# Patient Record
Sex: MALE | Race: BLACK OR AFRICAN AMERICAN | Employment: FULL TIME | URBAN - METROPOLITAN AREA
[De-identification: names, ages, dates, MRNs, and addresses within clinical notes are randomized per-mention and may not be internally consistent; named-entity substitution may affect disease eponyms.]

---

## 2018-08-10 ENCOUNTER — APPOINTMENT (OUTPATIENT)
Dept: CT IMAGING | Age: 43
DRG: 392 | End: 2018-08-10
Attending: NURSE PRACTITIONER
Payer: SELF-PAY

## 2018-08-10 ENCOUNTER — HOSPITAL ENCOUNTER (INPATIENT)
Age: 43
LOS: 3 days | Discharge: HOME | DRG: 392 | End: 2018-08-13
Attending: EMERGENCY MEDICINE | Admitting: SURGERY
Payer: SELF-PAY

## 2018-08-10 DIAGNOSIS — K57.80 DIVERTICULITIS OF INTESTINE WITH PERFORATION WITHOUT BLEEDING, UNSPECIFIED PART OF INTESTINAL TRACT: ICD-10-CM

## 2018-08-10 DIAGNOSIS — R10.84 ABDOMINAL PAIN, GENERALIZED: Primary | ICD-10-CM

## 2018-08-10 PROBLEM — K57.92 DIVERTICULITIS: Status: ACTIVE | Noted: 2018-08-10

## 2018-08-10 LAB
ALBUMIN SERPL-MCNC: 3.2 G/DL (ref 3.5–5)
ALBUMIN/GLOB SERPL: 0.7 {RATIO} (ref 1.1–2.2)
ALP SERPL-CCNC: 82 U/L (ref 45–117)
ALT SERPL-CCNC: 43 U/L (ref 12–78)
ANION GAP SERPL CALC-SCNC: 10 MMOL/L (ref 5–15)
APPEARANCE UR: CLEAR
AST SERPL-CCNC: 31 U/L (ref 15–37)
BACTERIA URNS QL MICRO: NEGATIVE /HPF
BASOPHILS # BLD: 0.1 K/UL (ref 0–0.1)
BASOPHILS NFR BLD: 0 % (ref 0–1)
BILIRUB SERPL-MCNC: 0.7 MG/DL (ref 0.2–1)
BILIRUB UR QL: NEGATIVE
BUN SERPL-MCNC: 7 MG/DL (ref 6–20)
BUN/CREAT SERPL: 6 (ref 12–20)
CALCIUM SERPL-MCNC: 8.5 MG/DL (ref 8.5–10.1)
CHLORIDE SERPL-SCNC: 102 MMOL/L (ref 97–108)
CO2 SERPL-SCNC: 22 MMOL/L (ref 21–32)
COLOR UR: ABNORMAL
CREAT SERPL-MCNC: 1.17 MG/DL (ref 0.7–1.3)
DIFFERENTIAL METHOD BLD: ABNORMAL
EOSINOPHIL # BLD: 0 K/UL (ref 0–0.4)
EOSINOPHIL NFR BLD: 0 % (ref 0–7)
EPITH CASTS URNS QL MICRO: ABNORMAL /LPF
ERYTHROCYTE [DISTWIDTH] IN BLOOD BY AUTOMATED COUNT: 13.5 % (ref 11.5–14.5)
GLOBULIN SER CALC-MCNC: 4.4 G/DL (ref 2–4)
GLUCOSE SERPL-MCNC: 118 MG/DL (ref 65–100)
GLUCOSE UR STRIP.AUTO-MCNC: NEGATIVE MG/DL
HCT VFR BLD AUTO: 45.3 % (ref 36.6–50.3)
HGB BLD-MCNC: 14.6 G/DL (ref 12.1–17)
HGB UR QL STRIP: NEGATIVE
IMM GRANULOCYTES # BLD: 0.1 K/UL (ref 0–0.04)
IMM GRANULOCYTES NFR BLD AUTO: 0 % (ref 0–0.5)
KETONES UR QL STRIP.AUTO: NEGATIVE MG/DL
LEUKOCYTE ESTERASE UR QL STRIP.AUTO: NEGATIVE
LIPASE SERPL-CCNC: 85 U/L (ref 73–393)
LYMPHOCYTES # BLD: 1.9 K/UL (ref 0.8–3.5)
LYMPHOCYTES NFR BLD: 9 % (ref 12–49)
MCH RBC QN AUTO: 26.9 PG (ref 26–34)
MCHC RBC AUTO-ENTMCNC: 32.2 G/DL (ref 30–36.5)
MCV RBC AUTO: 83.4 FL (ref 80–99)
MONOCYTES # BLD: 1.7 K/UL (ref 0–1)
MONOCYTES NFR BLD: 8 % (ref 5–13)
NEUTS SEG # BLD: 17.4 K/UL (ref 1.8–8)
NEUTS SEG NFR BLD: 82 % (ref 32–75)
NITRITE UR QL STRIP.AUTO: NEGATIVE
NRBC # BLD: 0 K/UL (ref 0–0.01)
NRBC BLD-RTO: 0 PER 100 WBC
PH UR STRIP: 7.5 [PH] (ref 5–8)
PLATELET # BLD AUTO: 293 K/UL (ref 150–400)
PMV BLD AUTO: 9.2 FL (ref 8.9–12.9)
POTASSIUM SERPL-SCNC: 4.1 MMOL/L (ref 3.5–5.1)
PROT SERPL-MCNC: 7.6 G/DL (ref 6.4–8.2)
PROT UR STRIP-MCNC: NEGATIVE MG/DL
RBC # BLD AUTO: 5.43 M/UL (ref 4.1–5.7)
RBC #/AREA URNS HPF: ABNORMAL /HPF (ref 0–5)
SODIUM SERPL-SCNC: 134 MMOL/L (ref 136–145)
SP GR UR REFRACTOMETRY: 1.02 (ref 1–1.03)
UR CULT HOLD, URHOLD: NORMAL
UROBILINOGEN UR QL STRIP.AUTO: 2 EU/DL (ref 0.2–1)
WBC # BLD AUTO: 21.1 K/UL (ref 4.1–11.1)
WBC URNS QL MICRO: ABNORMAL /HPF (ref 0–4)

## 2018-08-10 PROCEDURE — 36415 COLL VENOUS BLD VENIPUNCTURE: CPT | Performed by: NURSE PRACTITIONER

## 2018-08-10 PROCEDURE — 74011636320 HC RX REV CODE- 636/320: Performed by: RADIOLOGY

## 2018-08-10 PROCEDURE — 74011000258 HC RX REV CODE- 258: Performed by: SURGERY

## 2018-08-10 PROCEDURE — 74011250636 HC RX REV CODE- 250/636: Performed by: EMERGENCY MEDICINE

## 2018-08-10 PROCEDURE — 80053 COMPREHEN METABOLIC PANEL: CPT | Performed by: NURSE PRACTITIONER

## 2018-08-10 PROCEDURE — 96374 THER/PROPH/DIAG INJ IV PUSH: CPT

## 2018-08-10 PROCEDURE — 65270000029 HC RM PRIVATE

## 2018-08-10 PROCEDURE — 83690 ASSAY OF LIPASE: CPT | Performed by: NURSE PRACTITIONER

## 2018-08-10 PROCEDURE — 85025 COMPLETE CBC W/AUTO DIFF WBC: CPT | Performed by: NURSE PRACTITIONER

## 2018-08-10 PROCEDURE — 99284 EMERGENCY DEPT VISIT MOD MDM: CPT

## 2018-08-10 PROCEDURE — 74011250637 HC RX REV CODE- 250/637: Performed by: SURGERY

## 2018-08-10 PROCEDURE — 74011250636 HC RX REV CODE- 250/636: Performed by: SURGERY

## 2018-08-10 PROCEDURE — 81001 URINALYSIS AUTO W/SCOPE: CPT | Performed by: NURSE PRACTITIONER

## 2018-08-10 PROCEDURE — 74011000258 HC RX REV CODE- 258: Performed by: EMERGENCY MEDICINE

## 2018-08-10 PROCEDURE — 74177 CT ABD & PELVIS W/CONTRAST: CPT

## 2018-08-10 RX ORDER — HEPARIN SODIUM 5000 [USP'U]/ML
5000 INJECTION, SOLUTION INTRAVENOUS; SUBCUTANEOUS EVERY 8 HOURS
Status: DISCONTINUED | OUTPATIENT
Start: 2018-08-10 | End: 2018-08-13 | Stop reason: HOSPADM

## 2018-08-10 RX ORDER — DIPHENHYDRAMINE HYDROCHLORIDE 50 MG/ML
12.5 INJECTION, SOLUTION INTRAMUSCULAR; INTRAVENOUS
Status: ACTIVE | OUTPATIENT
Start: 2018-08-10 | End: 2018-08-11

## 2018-08-10 RX ORDER — ACETAMINOPHEN 325 MG/1
650 TABLET ORAL
Status: DISCONTINUED | OUTPATIENT
Start: 2018-08-10 | End: 2018-08-13 | Stop reason: HOSPADM

## 2018-08-10 RX ORDER — DEXTROSE, SODIUM CHLORIDE, AND POTASSIUM CHLORIDE 5; .9; .15 G/100ML; G/100ML; G/100ML
125 INJECTION INTRAVENOUS CONTINUOUS
Status: DISCONTINUED | OUTPATIENT
Start: 2018-08-10 | End: 2018-08-13 | Stop reason: HOSPADM

## 2018-08-10 RX ORDER — SODIUM CHLORIDE 0.9 % (FLUSH) 0.9 %
5-10 SYRINGE (ML) INJECTION EVERY 8 HOURS
Status: DISCONTINUED | OUTPATIENT
Start: 2018-08-10 | End: 2018-08-13 | Stop reason: HOSPADM

## 2018-08-10 RX ORDER — ACETAMINOPHEN 325 MG/1
650 TABLET ORAL
COMMUNITY

## 2018-08-10 RX ORDER — ONDANSETRON 2 MG/ML
4 INJECTION INTRAMUSCULAR; INTRAVENOUS
Status: DISCONTINUED | OUTPATIENT
Start: 2018-08-10 | End: 2018-08-13 | Stop reason: HOSPADM

## 2018-08-10 RX ORDER — KETOROLAC TROMETHAMINE 30 MG/ML
30 INJECTION, SOLUTION INTRAMUSCULAR; INTRAVENOUS
Status: DISCONTINUED | OUTPATIENT
Start: 2018-08-10 | End: 2018-08-13 | Stop reason: HOSPADM

## 2018-08-10 RX ORDER — SODIUM CHLORIDE 0.9 % (FLUSH) 0.9 %
5-10 SYRINGE (ML) INJECTION AS NEEDED
Status: DISCONTINUED | OUTPATIENT
Start: 2018-08-10 | End: 2018-08-13 | Stop reason: HOSPADM

## 2018-08-10 RX ADMIN — HEPARIN SODIUM 5000 UNITS: 5000 INJECTION INTRAVENOUS; SUBCUTANEOUS at 22:56

## 2018-08-10 RX ADMIN — DEXTROSE MONOHYDRATE, SODIUM CHLORIDE, AND POTASSIUM CHLORIDE 125 ML/HR: 50; 9; 1.49 INJECTION, SOLUTION INTRAVENOUS at 23:00

## 2018-08-10 RX ADMIN — SODIUM CHLORIDE 3.38 G: 900 INJECTION, SOLUTION INTRAVENOUS at 13:48

## 2018-08-10 RX ADMIN — SODIUM CHLORIDE 3.38 G: 900 INJECTION, SOLUTION INTRAVENOUS at 19:54

## 2018-08-10 RX ADMIN — ACETAMINOPHEN 650 MG: 325 TABLET ORAL at 14:37

## 2018-08-10 RX ADMIN — IOPAMIDOL 100 ML: 755 INJECTION, SOLUTION INTRAVENOUS at 12:07

## 2018-08-10 RX ADMIN — DEXTROSE MONOHYDRATE, SODIUM CHLORIDE, AND POTASSIUM CHLORIDE 125 ML/HR: 50; 9; 1.49 INJECTION, SOLUTION INTRAVENOUS at 14:33

## 2018-08-10 RX ADMIN — HEPARIN SODIUM 5000 UNITS: 5000 INJECTION INTRAVENOUS; SUBCUTANEOUS at 14:33

## 2018-08-10 NOTE — H&P
Surgery History and Physical    Subjective:      Virgil Hernandez is a 40 y.o. male who presents for evaluation of abdominal pain. The pain is described as severe and aching  and is 8/10 in intensity. Onset was 2 days ago. Symptoms have been gradually worsening since. Aggravating factors: eating moving. Alleviating factors: none. Associated symptoms: none. The patient denies fever or chills. No past medical history on file. No past surgical history on file. No family history on file. Social History     Social History    Marital status: SINGLE     Spouse name: N/A    Number of children: N/A    Years of education: N/A     Social History Main Topics    Smoking status: Not on file    Smokeless tobacco: Not on file    Alcohol use Not on file    Drug use: Not on file    Sexual activity: Not on file     Other Topics Concern    Not on file     Social History Narrative      Current Facility-Administered Medications   Medication Dose Route Frequency    piperacillin-tazobactam (ZOSYN) 3.375 g in 0.9% sodium chloride (MBP/ADV) 100 mL ADV  3.375 g IntraVENous NOW    sodium chloride (NS) flush 5-10 mL  5-10 mL IntraVENous Q8H    sodium chloride (NS) flush 5-10 mL  5-10 mL IntraVENous PRN    acetaminophen (TYLENOL) tablet 650 mg  650 mg Oral Q6H PRN    ondansetron (ZOFRAN) injection 4 mg  4 mg IntraVENous Q4H PRN    diphenhydrAMINE (BENADRYL) injection 12.5 mg  12.5 mg IntraVENous ONCE PRN    heparin (porcine) injection 5,000 Units  5,000 Units SubCUTAneous Q8H    piperacillin-tazobactam (ZOSYN) 3.375 g in 0.9% sodium chloride (MBP/ADV) 100 mL MBP  3.375 g IntraVENous Q6H    ketorolac (TORADOL) injection 30 mg  30 mg IntraVENous Q6H PRN     No current outpatient prescriptions on file.       No Known Allergies    Review of Systems:     []     Unable to obtain  ROS due to  []    mental status change  []    sedated   []    intubated   []    Total of 12 systems reviewed as follows:  Constitutional: negative fever, negative chills, negative weight loss  Eyes:   negative visual changes  ENT:   negative sore throat, tongue or lip swelling  Respiratory:  negative cough, negative dyspnea  Cards:  negative for chest pain, palpitations, lower extremity edema  GI:   negative for nausea, vomiting, diarrhea, and abdominal pain  :  negative for frequency, dysuria  Integument:  negative for rash and pruritus  Heme:  negative for easy bruising and gum/nose bleeding  Musculoskel: negative for myalgias,  back pain and muscle weakness  Neuro:  negative for headaches, dizziness, vertigo  Psych:  negative for feelings of anxiety, depression     Objective:      Patient Vitals for the past 8 hrs:   BP Temp Pulse Resp SpO2 Height Weight   08/10/18 1349 122/72 (!) 102.1 °F (38.9 °C) 99 18 97 % - -   08/10/18 1019 139/79 99.5 °F (37.5 °C) 100 18 98 % 5' 10\" (1.778 m) 120 kg (264 lb 8.8 oz)       Temp (24hrs), Av.8 °F (38.2 °C), Min:99.5 °F (37.5 °C), Max:102.1 °F (38.9 °C)      Physical Exam:  General:  Alert, cooperative, no distress, appears stated age. Eyes:  Conjunctivae/corneas clear. PERRL, EOMs intact. Nose: Nares normal. Septum midline. Mucosa normal. No drainage or sinus tenderness. Mouth/Throat: Lips, mucosa, and tongue normal. Teeth and gums normal.   Neck: Supple, symmetrical, trachea midline, no adenopathy, thyroid: no enlargment/tenderness/nodules, no carotid bruit and no JVD. Back:   Symmetric, no curvature. ROM normal. No CVA tenderness. Lungs:   Clear to auscultation bilaterally. Heart:  Regular rate and rhythm, S1, S2 normal, no murmur, click, rub or gallop. Abdomen:   Soft, tender in the LLQ Bowel sounds normal. No masses,  No organomegaly. Extremities: Extremities normal, atraumatic, no cyanosis or edema. Pulses: 2+ and symmetric all extremities.    Skin: Skin color, texture, turgor normal. No rashes or lesions   Lymph nodes: Cervical, supraclavicular, and axillary nodes normal.     Labs: Recent Labs      08/10/18   1041   WBC  21.1*   HGB  14.6   HCT  45.3   PLT  293     Recent Labs      08/10/18   1041   NA  134*   K  4.1   CL  102   CO2  22   GLU  118*   BUN  7   CREA  1.17   CA  8.5   ALB  3.2*   TBILI  0.7   SGOT  31   ALT  43     No results for input(s): INR in the last 72 hours. No lab exists for component: INREXT     CT scan was reviewed and this reveals sigmoid diverticulitis with localized perforation    Assessment:     Diverticulitis with localized perforation    Plan:     I discussed his current condition, the diagnosis and the management plan in detail. He has evidence of diverticulitis with localized perforation. He requires admission for in patient IV antibiotics, close monitoring, and IV fluids with bowel rest.   If his condition worsens he might need surgery with colectomy and colostomy  He understands and is agreeable with above plan.     Signed By: Denisse Rosa MD     August 10, 2018

## 2018-08-10 NOTE — PROGRESS NOTES
8/10/2018  6:40 PM  Case management note    Met with patient to discuss discharge planning. Confirmed demographics. Patient lives in a 2 story home with 8 steps to enter and 14 within. He lives with his mom and sister. Patient does not drive but works at [employer]. Patient can perform ADL's independently. Patient does not use a RX and has not seen MD in years. We discussed follow up and free clinics in area. Patient would benefit from paperwork on discharge for resources for good health. Reason for Admission:   diverculitis                   RRAT Score:          4           Plan for utilizing home health:      Not at this time                    Likelihood of Readmission:  Low/green     Higher if patient does get follow up and find a PCP                         Transition of Care Plan:    Unable to determine at this time    Care Management Interventions  PCP Verified by CM: No  Mode of Transport at Discharge: Self  Transition of Care Consult (CM Consult): Discharge Planning  Current Support Network: [facility] discussed with Pt/Family/Caregiver: Yes  Discharge Location  Discharge Placement: Unable to determine at this time  Cristal Espino, [address]                      .

## 2018-08-10 NOTE — ED NOTES
Family came out and said, \"is anyone going to see him yet? He is in pain and I know other people are in pain but so is he and he is a patient too. \" family reassured provider will be in shortly.

## 2018-08-10 NOTE — PROGRESS NOTES
[facility]: MED RECONCILIATION    Information obtained from: [facility]     Allergies: Review of patient's allergies indicates no known allergies. Prior to Admission Medications:     Medication Documentation Review Audit       Reviewed by [address], PHARMD (Pharmacist) on 08/10/18 at 1432         Medication Sig Documenting Provider Last Dose Status Taking?      acetaminophen (TYLENOL) 325 mg tablet Take 650 mg by mouth two (2) times daily as needed for Pain.  Historical Provider  Active Yes                  [address], [place]   Contact: [phone]

## 2018-08-10 NOTE — ED PROVIDER NOTES
HPI Comments: 40 y.o. male with past medical history significant for narcotic abuse who presents ambulatory from home with chief complaint of abdominal pain. Patient reports acute onset of lower abdominal pain 2 days ago that progressively got worse. He denies history of similar pain. He notes associated nausea and 1 episode of vomiting this morning. He also complains of fever and chills. Of note, patient denies history of abdominal disorders, including diverticulitis, diverticulosis, and ulcers. There are no other acute medical concerns at this time. Note written by Jose Blakely, as dictated by Lior Underwood NP 5:08 PM      The history is provided by the patient. No  was used. No past medical history on file. No past surgical history on file. No family history on file. Social History     Social History    Marital status: SINGLE     Spouse name: N/A    Number of children: N/A    Years of education: N/A     Occupational History    Not on file. Social History Main Topics    Smoking status: Not on file    Smokeless tobacco: Not on file    Alcohol use Not on file    Drug use: Not on file    Sexual activity: Not on file     Other Topics Concern    Not on file     Social History Narrative         ALLERGIES: Review of patient's allergies indicates no known allergies. Review of Systems   Constitutional: Positive for chills and fever. Negative for activity change, appetite change and fatigue. HENT: Negative for congestion, ear discharge, ear pain, sinus pain, sinus pressure, sore throat and trouble swallowing. Eyes: Negative for photophobia, pain, redness, itching and visual disturbance. Respiratory: Negative for chest tightness and shortness of breath. Cardiovascular: Negative for chest pain and palpitations. Gastrointestinal: Positive for abdominal pain, nausea and vomiting. Negative for abdominal distention. Endocrine: Negative. Genitourinary: Negative for difficulty urinating, frequency and urgency. Musculoskeletal: Negative for back pain, neck pain and neck stiffness. Skin: Negative for color change, pallor, rash and wound. Allergic/Immunologic: Negative. Neurological: Negative for dizziness, syncope, weakness and headaches. Hematological: Does not bruise/bleed easily. Psychiatric/Behavioral: Negative for behavioral problems. The patient is not nervous/anxious. All other systems reviewed and are negative. Vitals:    08/10/18 1019   BP: 139/79   Pulse: 100   Resp: 18   Temp: 99.5 °F (37.5 °C)   SpO2: 98%   Weight: 120 kg (264 lb 8.8 oz)   Height: 5' 10\" (1.778 m)            Physical Exam   Constitutional: He is oriented to person, place, and time. He appears well-developed and well-nourished. No distress. HENT:   Head: Normocephalic and atraumatic. Right Ear: External ear normal.   Left Ear: External ear normal.   Nose: Nose normal.   Mouth/Throat: Oropharynx is clear and moist.   Eyes: Conjunctivae and EOM are normal. Pupils are equal, round, and reactive to light. Right eye exhibits no discharge. Left eye exhibits no discharge. Neck: Normal range of motion. Neck supple. No JVD present. No tracheal deviation present. Cardiovascular: Normal rate, regular rhythm, normal heart sounds and intact distal pulses. Exam reveals no gallop. No murmur heard. Pulmonary/Chest: Effort normal and breath sounds normal. No respiratory distress. He has no wheezes. He has no rales. He exhibits no tenderness. Abdominal: Soft. Bowel sounds are normal. He exhibits no distension. There is tenderness. There is no rebound and no guarding. Genitourinary:   Genitourinary Comments: Negative     Musculoskeletal: Normal range of motion. He exhibits no edema or tenderness. Neurological: He is alert and oriented to person, place, and time. Skin: Skin is warm and dry. No rash noted. No erythema. No pallor.    Psychiatric: He has a normal mood and affect. His behavior is normal. Judgment and thought content normal.   Nursing note and vitals reviewed. MDM  Number of Diagnoses or Management Options  Abdominal pain, generalized: new and requires workup  Diverticulitis of intestine with perforation without bleeding, unspecified part of intestinal tract: new and requires workup  Diagnosis management comments: Plan:  Admit to general surgery service. Amount and/or Complexity of Data Reviewed  Clinical lab tests: reviewed and ordered  Tests in the radiology section of CPT®: ordered and reviewed          ED Course       Procedures    CONSULT NOTE:  1:12 PM Sabrina Camacho MD spoke with Dr. Tavon Perez for General Surgery. Discussed available diagnostic tests and clinical findings. Dr. Bev Escobar will see the pt.

## 2018-08-10 NOTE — ED TRIAGE NOTES
Patient presents with 2-day history of abdominal pain. Vomiting this morning. Last BM prior to onset of pain.

## 2018-08-10 NOTE — ED NOTES
Pt declines any medicine for nausea/pain at this time. Pt instructed to alert RN if he feels that he needs something for nausea or pain.

## 2018-08-10 NOTE — ED NOTES
Verbal shift change report given to THAD Mahoney (oncoming nurse) by THAD Orozco(offgoing nurse). Report included the following information SBAR, Kardex, ED Summary, MAR, Recent Results and Med Rec Status.

## 2018-08-10 NOTE — IP AVS SNAPSHOT
Jose Lopez 
 
 
 [address] [facility] [address] [facility] 
[phone] Patient: Vero Johnson MRN: [id number] :1975 About your hospitalization You were admitted on:  August 10, 2018 You last received care in the:  SF 4M POST SURG ORT 1 You were discharged on:  2018 Why you were hospitalized Your primary diagnosis was:  Not on File Your diagnoses also included:  Diverticulitis Follow-up Information Follow up With Details Comments Contact Info Corrina Gamino MD Schedule an appointment as soon as possible for a visit in 2 weeks   [facility] [address] [facility] [address] [facility] 
[phone] None   None (395) Patient stated that they have no PCP Discharge Orders None A check warren indicates which time of day the medication should be taken. My Medications START taking these medications Instructions Each Dose to Equal  
 Morning Noon Evening Bedtime  
 amoxicillin-clavulanate 875-125 mg per tablet Commonly known as:  AUGMENTIN Your last dose was: Your next dose is: Take 1 Tab by mouth every twelve (12) hours. 1 Tab CONTINUE taking these medications Instructions Each Dose to Equal  
 Morning Noon Evening Bedtime TYLENOL 325 mg tablet Generic drug:  acetaminophen Your last dose was: Your next dose is: Take 650 mg by mouth two (2) times daily as needed for Pain. 650 mg Where to Get Your Medications Information on where to get these meds will be given to you by the nurse or doctor. ! Ask your nurse or doctor about these medications  
  amoxicillin-clavulanate 875-125 mg per tablet Discharge Instructions Patient Discharge Instructions Vero Johnson / [id number] : 1975  Admitted 8/10/2018 Discharged: 8/13/2018 Take Home Medications · It is important that you take the medication exactly as they are prescribed. · Keep your medication in the bottles provided by the pharmacist and keep a list of the medication names, dosages, and times to be taken in your wallet. · Do not take other medications without consulting your doctor. What to do at [facility] Recommended diet: soft, low fiber diet for 2 weeks and then transition to high fiber diet Recommended activity: As tolerated Follow up with Dr. Arti Yañez in 2 weeks. Call [facility] to make an appointment. Call Dr. Arti Yañez or go to the ER if you develop worsening pain, fever, vomiting, or any other symptoms that concern you. Information obtained by : 
I understand that if any problems occur once I am at home I am to contact my physician. I understand and acknowledge receipt of the instructions indicated above. Physician's or R.N.'s Signature                                                                  Date/Time Patient or Representative Signature                                                          Date/Time Introducing [facility]! [employer] introduces [employer] patient portal. Now you can access parts of your medical record, email your doctor's office, and request medication refills online. 1. In your internet browser, go to [email or web address] 2. Click on the First Time User? Click Here link in the Sign In box. You will see the New Member Sign Up page. 3. Enter your [employer] Access Code exactly as it appears below.  You will not need to use this code after youve completed the sign-up process. If you do not sign up before the expiration date, you must request a new code. · [employer] Access Code: [email or web address] Expires: 11/8/2018 10:17 AM 
 
4. Enter the last four digits of your Social Security Number (xxxx) and Date of Birth (mm/dd/yyyy) as indicated and click Submit. You will be taken to the next sign-up page. 5. Create a [employer] ID. This will be your [employer] login ID and cannot be changed, so think of one that is secure and easy to remember. 6. Create a [employer] password. You can change your password at any time. 7. Enter your Password Reset Question and Answer. This can be used at a later time if you forget your password. 8. Enter your e-mail address. You will receive e-mail notification when new information is available in [address] 9. Click Sign Up. You can now view and download portions of your medical record. 10. Click the Download Summary menu link to download a portable copy of your medical information. If you have questions, please visit the Frequently Asked Questions section of the [employer] website. Remember, [employer] is NOT to be used for urgent needs. For medical emergencies, dial 911. Now available from your iPhone and Android! Introducing Timbo Frazier As a [facility] patient, I wanted to make you aware of our electronic visit tool called Timbo Frazier. [facility] 24/7 allows you to connect within minutes with a medical provider 24 hours a day, seven days a week via a mobile device or tablet or logging into a secure website from your computer. You can access Timbo Frazier from anywhere in the [place]. A virtual visit might be right for you when you have a simple condition and feel like you just dont want to get out of bed, or cant get away from work for an appointment, when your regular [facility] provider is not available (evenings, weekends or holidays), or when youre out of town and need minor care.   Electronic visits cost only $49 and if the [employer] 24/7 provider determines a prescription is needed to treat your condition, one can be electronically transmitted to a nearby pharmacy*. Please take a moment to enroll today if you have not already done so. The enrollment process is free and takes just a few minutes. To enroll, please download the [employer] 24/7 hank to your tablet or phone, or visit [email or web address] to enroll on your computer. And, as an [address] [place] [address] patient with a [employer] account, the results of your visits will be scanned into your electronic medical record and your primary care provider will be able to view the scanned results. We urge you to continue to see your regular [facility] provider for your ongoing medical care. And while your primary care provider may not be the one available when you seek a Timbo [employer] virtual visit, the peace of mind you get from getting a real diagnosis real time can be priceless. For more information on SYLOBbrendafin, view our Frequently Asked Questions (FAQs) at [email or web address]. Sincerely, 
 
Lydia Neal MD 
Chief Medical Officer [facility] Kalyani Anant *:  certain medications cannot be prescribed via [employer]renda[employer] Providers Seen During Your Hospitalization Provider Specialty Primary office phone Genesis España MD Emergency Medicine [phone] Adalberto Garcia MD General Surgery [phone] Your Primary Care Physician (PCP) Primary Care Physician Office Phone Office Fax NONE ** None ** ** None ** You are allergic to the following No active allergies Recent Documentation Height Weight BMI  
  
  
 1.778 m 120 kg 37.96 kg/m2 Emergency Contacts Name Discharge Info Relation Home Work Mobile [facility] DISCHARGE CAREGIVER [3] Mother [14] [phone] Patient Belongings  The following personal items are in your possession at time of discharge: 
  Dental Appliances: None         Home Medications: None   Jewelry: None  Clothing: At bedside    Other Valuables: Cell Phone, MINAYA Please provide this summary of care documentation to your next provider. Signatures-by signing, you are acknowledging that this After Visit Summary has been reviewed with you and you have received a copy. Patient Signature:  ____________________________________________________________ Date:  ____________________________________________________________  
  
[facility] Signature:  ____________________________________________________________ Date:  ____________________________________________________________

## 2018-08-10 NOTE — IP AVS SNAPSHOT
[address] 
[phone] Patient: Renetta Jackson MRN: [id number] :1975 A check warren indicates which time of day the medication should be taken. My Medications START taking these medications Instructions Each Dose to Equal  
 Morning Noon Evening Bedtime  
 amoxicillin-clavulanate 875-125 mg per tablet Commonly known as:  AUGMENTIN Your last dose was: Your next dose is: Take 1 Tab by mouth every twelve (12) hours. 1 Tab CONTINUE taking these medications Instructions Each Dose to Equal  
 Morning Noon Evening Bedtime TYLENOL 325 mg tablet Generic drug:  acetaminophen Your last dose was: Your next dose is: Take 650 mg by mouth two (2) times daily as needed for Pain. 650 mg Where to Get Your Medications Information on where to get these meds will be given to you by the nurse or doctor. ! Ask your nurse or doctor about these medications  
  amoxicillin-clavulanate 875-125 mg per tablet

## 2018-08-11 LAB
ANION GAP SERPL CALC-SCNC: 10 MMOL/L (ref 5–15)
BASOPHILS # BLD: 0.1 K/UL (ref 0–0.1)
BASOPHILS NFR BLD: 0 % (ref 0–1)
BUN SERPL-MCNC: 6 MG/DL (ref 6–20)
BUN/CREAT SERPL: 6 (ref 12–20)
CALCIUM SERPL-MCNC: 7.9 MG/DL (ref 8.5–10.1)
CHLORIDE SERPL-SCNC: 106 MMOL/L (ref 97–108)
CO2 SERPL-SCNC: 22 MMOL/L (ref 21–32)
CREAT SERPL-MCNC: 1.07 MG/DL (ref 0.7–1.3)
DIFFERENTIAL METHOD BLD: ABNORMAL
EOSINOPHIL # BLD: 0.1 K/UL (ref 0–0.4)
EOSINOPHIL NFR BLD: 1 % (ref 0–7)
ERYTHROCYTE [DISTWIDTH] IN BLOOD BY AUTOMATED COUNT: 13.5 % (ref 11.5–14.5)
GLUCOSE SERPL-MCNC: 207 MG/DL (ref 65–100)
HCT VFR BLD AUTO: 41.6 % (ref 36.6–50.3)
HGB BLD-MCNC: 12.9 G/DL (ref 12.1–17)
IMM GRANULOCYTES # BLD: 0.1 K/UL (ref 0–0.04)
IMM GRANULOCYTES NFR BLD AUTO: 1 % (ref 0–0.5)
LYMPHOCYTES # BLD: 2.4 K/UL (ref 0.8–3.5)
LYMPHOCYTES NFR BLD: 16 % (ref 12–49)
MCH RBC QN AUTO: 26.7 PG (ref 26–34)
MCHC RBC AUTO-ENTMCNC: 31 G/DL (ref 30–36.5)
MCV RBC AUTO: 86 FL (ref 80–99)
MONOCYTES # BLD: 1.2 K/UL (ref 0–1)
MONOCYTES NFR BLD: 8 % (ref 5–13)
NEUTS SEG # BLD: 11.2 K/UL (ref 1.8–8)
NEUTS SEG NFR BLD: 75 % (ref 32–75)
NRBC # BLD: 0 K/UL (ref 0–0.01)
NRBC BLD-RTO: 0 PER 100 WBC
PLATELET # BLD AUTO: 236 K/UL (ref 150–400)
PMV BLD AUTO: 9.3 FL (ref 8.9–12.9)
POTASSIUM SERPL-SCNC: 4.6 MMOL/L (ref 3.5–5.1)
RBC # BLD AUTO: 4.84 M/UL (ref 4.1–5.7)
SODIUM SERPL-SCNC: 138 MMOL/L (ref 136–145)
WBC # BLD AUTO: 15 K/UL (ref 4.1–11.1)

## 2018-08-11 PROCEDURE — 36415 COLL VENOUS BLD VENIPUNCTURE: CPT | Performed by: SURGERY

## 2018-08-11 PROCEDURE — 80048 BASIC METABOLIC PNL TOTAL CA: CPT | Performed by: SURGERY

## 2018-08-11 PROCEDURE — 85025 COMPLETE CBC W/AUTO DIFF WBC: CPT | Performed by: SURGERY

## 2018-08-11 PROCEDURE — 74011250636 HC RX REV CODE- 250/636: Performed by: SURGERY

## 2018-08-11 PROCEDURE — 74011000258 HC RX REV CODE- 258: Performed by: SURGERY

## 2018-08-11 PROCEDURE — 65270000029 HC RM PRIVATE

## 2018-08-11 RX ADMIN — HEPARIN SODIUM 5000 UNITS: 5000 INJECTION INTRAVENOUS; SUBCUTANEOUS at 05:50

## 2018-08-11 RX ADMIN — HEPARIN SODIUM 5000 UNITS: 5000 INJECTION INTRAVENOUS; SUBCUTANEOUS at 13:17

## 2018-08-11 RX ADMIN — SODIUM CHLORIDE 3.38 G: 900 INJECTION, SOLUTION INTRAVENOUS at 04:04

## 2018-08-11 RX ADMIN — Medication 10 ML: at 13:17

## 2018-08-11 RX ADMIN — SODIUM CHLORIDE 3.38 G: 900 INJECTION, SOLUTION INTRAVENOUS at 11:53

## 2018-08-11 RX ADMIN — DEXTROSE MONOHYDRATE, SODIUM CHLORIDE, AND POTASSIUM CHLORIDE 125 ML/HR: 50; 9; 1.49 INJECTION, SOLUTION INTRAVENOUS at 11:50

## 2018-08-11 RX ADMIN — SODIUM CHLORIDE 3.38 G: 900 INJECTION, SOLUTION INTRAVENOUS at 19:58

## 2018-08-11 NOTE — PROGRESS NOTES
General Surgery Daily Progress Note    Patient: Avani Reyes MRN: [id number]  SSN: [id number]    YOB: 1975  Age: 40 y.o. Sex: male      Admit Date: 8/10/2018    Subjective:   Patient denies any pain. He has not had any pain meds since I last saw him. He spiked a high fever yesterday. Current Facility-Administered Medications   Medication Dose Route Frequency    sodium chloride (NS) flush 5-10 mL  5-10 mL IntraVENous Q8H    sodium chloride (NS) flush 5-10 mL  5-10 mL IntraVENous PRN    acetaminophen (TYLENOL) tablet 650 mg  650 mg Oral Q6H PRN    ondansetron (ZOFRAN) injection 4 mg  4 mg IntraVENous Q4H PRN    diphenhydrAMINE (BENADRYL) injection 12.5 mg  12.5 mg IntraVENous ONCE PRN    heparin (porcine) injection 5,000 Units  5,000 Units SubCUTAneous Q8H    ketorolac (TORADOL) injection 30 mg  30 mg IntraVENous Q6H PRN    dextrose 5% - 0.9% NaCl with KCl 20 mEq/L infusion  125 mL/hr IntraVENous CONTINUOUS    piperacillin-tazobactam (ZOSYN) 3.375 g in 0.9% sodium chloride (MBP/ADV) 100 mL ADV  3.375 g IntraVENous Q8H        Objective:   08/11 0701 - 08/11 1900  In: 583.3 [I.V.:583.3]  Out: -      Patient Vitals for the past 8 hrs:   BP Temp Pulse Resp SpO2   08/11/18 1139 125/75 98.3 °F (36.8 °C) 81 18 98 %   08/11/18 0824 - - - - 96 %   08/11/18 0814 120/75 98.5 °F (36.9 °C) 88 20 98 %   08/11/18 0600 121/79 98.5 °F (36.9 °C) 87 17 96 %   08/11/18 0500 107/78 - - - 95 %   08/11/18 0400 103/58 - - - 95 %       Physical Exam:  General: Alert, cooperative, no distress, appears stated age. Neck:  Supple, symmetrical, trachea midline, no adenopathy, thyroid: no                           enlargement/tenderness/nodules, no carotid bruit and no JVD. Lungs: Clear to auscultation bilaterally. Heart:  Regular rate and rhythm, S1, S2 normal, no murmur, click, rub or gallop. Abdomen: Tender LLQ with no rebound . Bowel sounds normal. No masses,  No organomegaly.   Extremities: Extremities normal, atraumatic, no cyanosis or edema.   Skin:  Skin color, texture, turgor normal. No rashes or lesions    Labs: Recent Labs      08/11/18   0352   WBC  15.0*   HGB  12.9   HCT  41.6   PLT  236     Recent Labs      08/11/18   0352  08/10/18   1041   NA  138  134*   K  4.6  4.1   CL  106  102   CO2  22  22   GLU  207*  118*   BUN  6  7   CREA  1.07  1.17   CA  7.9*  8.5   ALB   --   3.2*   TBILI   --   0.7   SGOT   --   31   ALT   --   43     X-ray   Assessment / Plan:   · Stable with no signs of peritonitis  · Continue expectant care  · Keep on clear liquids    Active Problems:    Diverticulitis (8/10/2018)

## 2018-08-11 NOTE — ED NOTES
Bedside shift change report given to Jenni Leos RN (oncoming nurse) by Graylin Aase, RN (offgoing nurse). Report included the following information SBAR, Kardex, ED Summary, Intake/Output, MAR and Recent Results.

## 2018-08-11 NOTE — PROGRESS NOTES
TRANSFER - OUT REPORT:    Verbal report given to Shruthi Feldman RN(name) on [facility]  being transferred to [facility] (unit) for routine progression of care       Report consisted of patients Situation, Background, Assessment and   Recommendations(SBAR). Information from the following report(s) SBAR, Kardex, Procedure Summary, Intake/Output, MAR and Recent Results was reviewed with the receiving nurse. Lines:   Peripheral IV 08/10/18 Right Antecubital (Active)   Site Assessment Clean, dry, & intact 8/11/2018  8:14 AM   Phlebitis Assessment 0 8/11/2018  8:14 AM   Infiltration Assessment 0 8/11/2018  8:14 AM   Dressing Status Clean, dry, & intact 8/11/2018  8:14 AM   Dressing Type Tape;Transparent 8/11/2018  8:14 AM   Hub Color/Line Status Pink;Capped;Flushed 8/11/2018  8:14 AM   Alcohol Cap Used Yes 8/11/2018  8:14 AM        Opportunity for questions and clarification was provided.

## 2018-08-11 NOTE — PROGRESS NOTES
Problem: Falls - Risk of  Goal: *Absence of Falls  Document Tasha Fall Risk and appropriate interventions in the flowsheet.    Outcome: Progressing Towards Goal  Fall Risk Interventions:            Medication Interventions: Patient to call before getting OOB, Teach patient to arise slowly, Assess postural VS orthostatic hypotension

## 2018-08-12 LAB
ANION GAP SERPL CALC-SCNC: 9 MMOL/L (ref 5–15)
BASOPHILS # BLD: 0.1 K/UL (ref 0–0.1)
BASOPHILS NFR BLD: 1 % (ref 0–1)
BUN SERPL-MCNC: 6 MG/DL (ref 6–20)
BUN/CREAT SERPL: 6 (ref 12–20)
CALCIUM SERPL-MCNC: 8.2 MG/DL (ref 8.5–10.1)
CHLORIDE SERPL-SCNC: 106 MMOL/L (ref 97–108)
CO2 SERPL-SCNC: 24 MMOL/L (ref 21–32)
CREAT SERPL-MCNC: 1 MG/DL (ref 0.7–1.3)
DIFFERENTIAL METHOD BLD: NORMAL
EOSINOPHIL # BLD: 0.2 K/UL (ref 0–0.4)
EOSINOPHIL NFR BLD: 2 % (ref 0–7)
ERYTHROCYTE [DISTWIDTH] IN BLOOD BY AUTOMATED COUNT: 13.7 % (ref 11.5–14.5)
GLUCOSE SERPL-MCNC: 109 MG/DL (ref 65–100)
HCT VFR BLD AUTO: 42.2 % (ref 36.6–50.3)
HGB BLD-MCNC: 13.1 G/DL (ref 12.1–17)
IMM GRANULOCYTES # BLD: 0 K/UL (ref 0–0.04)
IMM GRANULOCYTES NFR BLD AUTO: 0 % (ref 0–0.5)
LYMPHOCYTES # BLD: 2 K/UL (ref 0.8–3.5)
LYMPHOCYTES NFR BLD: 20 % (ref 12–49)
MCH RBC QN AUTO: 26.5 PG (ref 26–34)
MCHC RBC AUTO-ENTMCNC: 31 G/DL (ref 30–36.5)
MCV RBC AUTO: 85.4 FL (ref 80–99)
MONOCYTES # BLD: 0.8 K/UL (ref 0–1)
MONOCYTES NFR BLD: 7 % (ref 5–13)
NEUTS SEG # BLD: 7.3 K/UL (ref 1.8–8)
NEUTS SEG NFR BLD: 70 % (ref 32–75)
NRBC # BLD: 0 K/UL (ref 0–0.01)
NRBC BLD-RTO: 0 PER 100 WBC
PLATELET # BLD AUTO: 228 K/UL (ref 150–400)
PMV BLD AUTO: 9.1 FL (ref 8.9–12.9)
POTASSIUM SERPL-SCNC: 4.3 MMOL/L (ref 3.5–5.1)
RBC # BLD AUTO: 4.94 M/UL (ref 4.1–5.7)
SODIUM SERPL-SCNC: 139 MMOL/L (ref 136–145)
WBC # BLD AUTO: 10.3 K/UL (ref 4.1–11.1)

## 2018-08-12 PROCEDURE — 36415 COLL VENOUS BLD VENIPUNCTURE: CPT | Performed by: SURGERY

## 2018-08-12 PROCEDURE — 65270000029 HC RM PRIVATE

## 2018-08-12 PROCEDURE — 85025 COMPLETE CBC W/AUTO DIFF WBC: CPT | Performed by: SURGERY

## 2018-08-12 PROCEDURE — 74011000258 HC RX REV CODE- 258: Performed by: SURGERY

## 2018-08-12 PROCEDURE — 74011250636 HC RX REV CODE- 250/636: Performed by: SURGERY

## 2018-08-12 PROCEDURE — 80048 BASIC METABOLIC PNL TOTAL CA: CPT | Performed by: SURGERY

## 2018-08-12 RX ADMIN — SODIUM CHLORIDE 3.38 G: 900 INJECTION, SOLUTION INTRAVENOUS at 19:51

## 2018-08-12 RX ADMIN — HEPARIN SODIUM 5000 UNITS: 5000 INJECTION INTRAVENOUS; SUBCUTANEOUS at 05:15

## 2018-08-12 RX ADMIN — HEPARIN SODIUM 5000 UNITS: 5000 INJECTION INTRAVENOUS; SUBCUTANEOUS at 13:54

## 2018-08-12 RX ADMIN — Medication 10 ML: at 13:55

## 2018-08-12 RX ADMIN — DEXTROSE MONOHYDRATE, SODIUM CHLORIDE, AND POTASSIUM CHLORIDE 125 ML/HR: 50; 9; 1.49 INJECTION, SOLUTION INTRAVENOUS at 03:18

## 2018-08-12 RX ADMIN — SODIUM CHLORIDE 3.38 G: 900 INJECTION, SOLUTION INTRAVENOUS at 03:19

## 2018-08-12 RX ADMIN — SODIUM CHLORIDE 3.38 G: 900 INJECTION, SOLUTION INTRAVENOUS at 11:22

## 2018-08-12 NOTE — PROGRESS NOTES
Bedside shift change report given to [address] [facility] [address] (oncoming nurse) by Deja Reyes RN (offgoing nurse). Report included the following information SBAR, Kardex, Intake/Output, MAR and Recent Results.

## 2018-08-12 NOTE — PROGRESS NOTES
General Surgery Daily Progress Note    Patient: Halie Birmingham MRN: [id number]  SSN: [id number]    YOB: 1975  Age: 40 y.o. Sex: male      Admit Date: 8/10/2018    Subjective:   Patient denies any abd pain, nausea or vomiting. He is passing flatus and had a BM. Current Facility-Administered Medications   Medication Dose Route Frequency    sodium chloride (NS) flush 5-10 mL  5-10 mL IntraVENous Q8H    sodium chloride (NS) flush 5-10 mL  5-10 mL IntraVENous PRN    acetaminophen (TYLENOL) tablet 650 mg  650 mg Oral Q6H PRN    ondansetron (ZOFRAN) injection 4 mg  4 mg IntraVENous Q4H PRN    heparin (porcine) injection 5,000 Units  5,000 Units SubCUTAneous Q8H    ketorolac (TORADOL) injection 30 mg  30 mg IntraVENous Q6H PRN    dextrose 5% - 0.9% NaCl with KCl 20 mEq/L infusion  125 mL/hr IntraVENous CONTINUOUS    piperacillin-tazobactam (ZOSYN) 3.375 g in 0.9% sodium chloride (MBP/ADV) 100 mL ADV  3.375 g IntraVENous Q8H        Objective:      08/10 1901 - 08/12 0700  In: 6711 [P.O.:120; I.V.:1500]  Out: -   Patient Vitals for the past 8 hrs:   BP Temp Pulse Resp SpO2   08/12/18 1120 (!) 89/58 98.7 °F (37.1 °C) 84 18 90 %   08/12/18 0739 115/65 98.4 °F (36.9 °C) 70 18 96 %   08/12/18 0410 97/64 97.9 °F (36.6 °C) 69 16 96 %       Physical Exam:  General: Alert, cooperative, no distress, appears stated age. Neck:  Supple, symmetrical, trachea midline, no adenopathy, thyroid: no                           enlargement/tenderness/nodules, no carotid bruit and no JVD. Lungs: Clear to auscultation bilaterally. Heart:  Regular rate and rhythm, S1, S2 normal, no murmur, click, rub or gallop. Abdomen: Soft, non-tender. Bowel sounds normal. No masses,  No organomegaly. Extremities: Extremities normal, atraumatic, no cyanosis or edema.   Skin:  Skin color, texture, turgor normal. No rashes or lesions    Labs: Recent Labs      08/12/18   0321   WBC  10.3   HGB  13.1   HCT  42.2   PLT  228     Recent Labs      08/12/18   0321   08/10/18   1041   NA  139   < >  134*   K  4.3   < >  4.1   CL  106   < >  102   CO2  24   < >  22   GLU  109*   < >  118*   BUN  6   < >  7   CREA  1.00   < >  1.17   CA  8.2*   < >  8.5   ALB   --    --   3.2*   TBILI   --    --   0.7   SGOT   --    --   31   ALT   --    --   43    < > = values in this interval not displayed.      X-ray   Assessment / Plan:   · Improving diverticulitis  · Advance to GI lite diet    Active Problems:    Diverticulitis (8/10/2018)

## 2018-08-13 VITALS
BODY MASS INDEX: 37.87 KG/M2 | DIASTOLIC BLOOD PRESSURE: 67 MMHG | RESPIRATION RATE: 18 BRPM | TEMPERATURE: 97.6 F | HEIGHT: 70 IN | OXYGEN SATURATION: 99 % | WEIGHT: 264.55 LBS | HEART RATE: 63 BPM | SYSTOLIC BLOOD PRESSURE: 109 MMHG

## 2018-08-13 LAB
ANION GAP SERPL CALC-SCNC: 5 MMOL/L (ref 5–15)
BASOPHILS # BLD: 0.1 K/UL (ref 0–0.1)
BASOPHILS NFR BLD: 1 % (ref 0–1)
BUN SERPL-MCNC: 9 MG/DL (ref 6–20)
BUN/CREAT SERPL: 8 (ref 12–20)
CALCIUM SERPL-MCNC: 8.2 MG/DL (ref 8.5–10.1)
CHLORIDE SERPL-SCNC: 106 MMOL/L (ref 97–108)
CO2 SERPL-SCNC: 26 MMOL/L (ref 21–32)
CREAT SERPL-MCNC: 1.13 MG/DL (ref 0.7–1.3)
DIFFERENTIAL METHOD BLD: ABNORMAL
EOSINOPHIL # BLD: 0.3 K/UL (ref 0–0.4)
EOSINOPHIL NFR BLD: 4 % (ref 0–7)
ERYTHROCYTE [DISTWIDTH] IN BLOOD BY AUTOMATED COUNT: 13.7 % (ref 11.5–14.5)
GLUCOSE SERPL-MCNC: 112 MG/DL (ref 65–100)
HCT VFR BLD AUTO: 42.2 % (ref 36.6–50.3)
HGB BLD-MCNC: 13.3 G/DL (ref 12.1–17)
IMM GRANULOCYTES # BLD: 0 K/UL (ref 0–0.04)
IMM GRANULOCYTES NFR BLD AUTO: 1 % (ref 0–0.5)
LYMPHOCYTES # BLD: 2.1 K/UL (ref 0.8–3.5)
LYMPHOCYTES NFR BLD: 26 % (ref 12–49)
MCH RBC QN AUTO: 27.3 PG (ref 26–34)
MCHC RBC AUTO-ENTMCNC: 31.5 G/DL (ref 30–36.5)
MCV RBC AUTO: 86.5 FL (ref 80–99)
MONOCYTES # BLD: 0.7 K/UL (ref 0–1)
MONOCYTES NFR BLD: 9 % (ref 5–13)
NEUTS SEG # BLD: 4.8 K/UL (ref 1.8–8)
NEUTS SEG NFR BLD: 59 % (ref 32–75)
NRBC # BLD: 0 K/UL (ref 0–0.01)
NRBC BLD-RTO: 0 PER 100 WBC
PLATELET # BLD AUTO: 262 K/UL (ref 150–400)
PMV BLD AUTO: 9.6 FL (ref 8.9–12.9)
POTASSIUM SERPL-SCNC: 4.1 MMOL/L (ref 3.5–5.1)
RBC # BLD AUTO: 4.88 M/UL (ref 4.1–5.7)
SODIUM SERPL-SCNC: 137 MMOL/L (ref 136–145)
WBC # BLD AUTO: 8.1 K/UL (ref 4.1–11.1)

## 2018-08-13 PROCEDURE — 74011250636 HC RX REV CODE- 250/636: Performed by: SURGERY

## 2018-08-13 PROCEDURE — 85025 COMPLETE CBC W/AUTO DIFF WBC: CPT | Performed by: SURGERY

## 2018-08-13 PROCEDURE — 80048 BASIC METABOLIC PNL TOTAL CA: CPT | Performed by: SURGERY

## 2018-08-13 PROCEDURE — 36415 COLL VENOUS BLD VENIPUNCTURE: CPT | Performed by: SURGERY

## 2018-08-13 PROCEDURE — 74011000258 HC RX REV CODE- 258: Performed by: SURGERY

## 2018-08-13 RX ORDER — AMOXICILLIN AND CLAVULANATE POTASSIUM 875; 125 MG/1; MG/1
1 TABLET, FILM COATED ORAL EVERY 12 HOURS
Qty: 24 TAB | Refills: 0 | Status: SHIPPED | OUTPATIENT
Start: 2018-08-13

## 2018-08-13 RX ADMIN — HEPARIN SODIUM 5000 UNITS: 5000 INJECTION INTRAVENOUS; SUBCUTANEOUS at 05:33

## 2018-08-13 RX ADMIN — DEXTROSE MONOHYDRATE, SODIUM CHLORIDE, AND POTASSIUM CHLORIDE 125 ML/HR: 50; 9; 1.49 INJECTION, SOLUTION INTRAVENOUS at 01:01

## 2018-08-13 RX ADMIN — SODIUM CHLORIDE 3.38 G: 900 INJECTION, SOLUTION INTRAVENOUS at 03:19

## 2018-08-13 NOTE — PROGRESS NOTES
Patient discharged home, ambulated, went over discharge instructions with patient and prescriptions given. Follow-up care  with physician, pt to make appointment. Patient understood instructions and patient in no apparent distress.

## 2018-08-13 NOTE — PROGRESS NOTES
Per Jose Manuel MEADOWS, please excuse patient Demetris Badillo from work the rest of this week, patient may return on August 20, 2018.     Kamini Florse RN  Verbal order from Jose Manuel MEADOWS    [address], [address]  [phone]

## 2018-08-13 NOTE — WOUND CARE
Wound care consult to assess the RLE. Alert, no distress- discharge to home today. Assessment  LLE knee and anterior lower leg- 2 large areas over the knee and leg- skin is red, pink and dry- scaling appearance- states has developed over the last month or so- area itching- no open areas. Patient to follow up for skin care and recommendations for derm care- patient has good understanding.   Gilberto Weldon RN [facility], [facility].

## 2018-08-13 NOTE — PROGRESS NOTES
General Surgery Daily Progress Note    Patient: Austin Cortez MRN: [id number]  SSN: [id number]    YOB: 1975  Age: 40 y.o. Sex: male      Admit Date: 8/10/2018    Subjective:   Patient denies any abd pain. He is passing flatus and having BM. Current Facility-Administered Medications   Medication Dose Route Frequency    sodium chloride (NS) flush 5-10 mL  5-10 mL IntraVENous Q8H    sodium chloride (NS) flush 5-10 mL  5-10 mL IntraVENous PRN    acetaminophen (TYLENOL) tablet 650 mg  650 mg Oral Q6H PRN    ondansetron (ZOFRAN) injection 4 mg  4 mg IntraVENous Q4H PRN    heparin (porcine) injection 5,000 Units  5,000 Units SubCUTAneous Q8H    ketorolac (TORADOL) injection 30 mg  30 mg IntraVENous Q6H PRN    dextrose 5% - 0.9% NaCl with KCl 20 mEq/L infusion  125 mL/hr IntraVENous CONTINUOUS    piperacillin-tazobactam (ZOSYN) 3.375 g in 0.9% sodium chloride (MBP/ADV) 100 mL ADV  3.375 g IntraVENous Q8H        Objective:         Patient Vitals for the past 8 hrs:   BP Temp Pulse Resp SpO2   08/13/18 0826 109/67 97.6 °F (36.4 °C) 63 18 99 %   08/13/18 0323 117/69 97.8 °F (36.6 °C) (!) 57 16 95 %       Physical Exam:  General: Alert, cooperative, no distress, appears stated age. Neck:  Supple, symmetrical, trachea midline, no adenopathy, thyroid: no                           enlargement/tenderness/nodules, no carotid bruit and no JVD. Lungs: Clear to auscultation bilaterally. Heart:  Regular rate and rhythm, S1, S2 normal, no murmur, click, rub or gallop. Abdomen: Soft, non-tender. Bowel sounds normal. No masses,  No organomegaly. Extremities: Extremities normal, atraumatic, no cyanosis or edema.   Skin:  Skin color, texture, turgor normal. No rashes or lesions    Labs: Recent Labs      08/13/18   0250   WBC  8.1   HGB  13.3   HCT  42.2   PLT  262     Recent Labs      08/13/18   0250   08/10/18   1041   NA  137   < >  134*   K  4.1   < >  4.1   CL  106   < >  102   CO2  26   < >  22   GLU 112*   < >  118*   BUN  9   < >  7   CREA  1.13   < >  1.17   CA  8.2*   < >  8.5   ALB   --    --   3.2*   TBILI   --    --   0.7   SGOT   --    --   31   ALT   --    --   43    < > = values in this interval not displayed.      X-ray   Assessment / Plan:   · Resolving diverticuliits   · DC home today with PO antibiotics   · FU in two weeks    Active Problems:    Diverticulitis (8/10/2018)

## 2018-08-13 NOTE — DISCHARGE INSTRUCTIONS
Patient Discharge Instructions    Wesly Jimenez / [id number] : 1975    Admitted 8/10/2018 Discharged: 2018     Take Home Medications       · It is important that you take the medication exactly as they are prescribed. · Keep your medication in the bottles provided by the pharmacist and keep a list of the medication names, dosages, and times to be taken in your wallet. · Do not take other medications without consulting your doctor. What to do at Home    Recommended diet: soft, low fiber diet for 2 weeks and then transition to high fiber diet     Recommended activity: As tolerated    Follow up with Dr. Karen Davey in 2 weeks. Call [facility] to make an appointment. Call Dr. Karen Davey or go to the ER if you develop worsening pain, fever, vomiting, or any other symptoms that concern you. Information obtained by :  I understand that if any problems occur once I am at home I am to contact my physician. I understand and acknowledge receipt of the instructions indicated above.                                                                                                                                            Physician's or R.N.'s Signature                                                                  Date/Time                                                                                                                                              Patient or Representative Signature                                                          Date/Time

## 2018-08-13 NOTE — PROGRESS NOTES
8/13/2018  9:16 AM  Pt discharge noted. CM reviewed EMR. No discharge service needs indicated. Pt's family will provide transport upon discharge.

## 2018-08-13 NOTE — DISCHARGE SUMMARY
Surgery Discharge Summary     Patient ID:  Baljeet Gallegos  [id number]  16 y.o.  1975    Admit date: 8/10/2018    Discharge date and time: 8/13/2018  9:58 AM     Admission Diagnoses:    Patient Active Problem List   Diagnosis Code    Diverticulitis K57.92       Discharge Diagnoses: There are no discharge diagnoses documented for the most recent discharge. Patient Active Problem List   Diagnosis Code    Diverticulitis K57.92       Procedures for this admission: [facility] Course: Mr. Christine Parrish presented 7/7 with c/o abdominal pain and was found to have evidence of acute diverticulitis with localized perforation. He was admitted and managed conservatively with bowel rest and ABX. He was discharged home on PO ABX and will follow up in 2 weeks. Disposition: home    Discharged Condition : stable    Instructions: Follow-up in office  in 2 weeks.               Signed:  DORI Hollins  8/13/2018  11:36 AM

## 2022-03-19 PROBLEM — K57.92 DIVERTICULITIS: Status: ACTIVE | Noted: 2018-08-10

## 2025-03-22 ENCOUNTER — APPOINTMENT (OUTPATIENT)
Facility: HOSPITAL | Age: 50
End: 2025-03-22
Payer: COMMERCIAL

## 2025-03-22 ENCOUNTER — HOSPITAL ENCOUNTER (EMERGENCY)
Facility: HOSPITAL | Age: 50
Discharge: HOME | End: 2025-03-22
Attending: EMERGENCY MEDICINE
Payer: COMMERCIAL

## 2025-03-22 VITALS
DIASTOLIC BLOOD PRESSURE: 97 MMHG | OXYGEN SATURATION: 100 % | TEMPERATURE: 98.2 F | SYSTOLIC BLOOD PRESSURE: 159 MMHG | HEART RATE: 58 BPM | RESPIRATION RATE: 16 BRPM

## 2025-03-22 DIAGNOSIS — K80.20 CALCULUS OF GALLBLADDER WITHOUT CHOLECYSTITIS WITHOUT OBSTRUCTION: Primary | ICD-10-CM

## 2025-03-22 DIAGNOSIS — R10.11 ABDOMINAL PAIN, RIGHT UPPER QUADRANT: ICD-10-CM

## 2025-03-22 LAB
ALBUMIN SERPL-MCNC: 3.5 G/DL (ref 3.5–5)
ALBUMIN/GLOB SERPL: 0.8 (ref 1.1–2.2)
ALP SERPL-CCNC: 77 U/L (ref 45–117)
ALT SERPL-CCNC: 19 U/L (ref 12–78)
ANION GAP SERPL CALC-SCNC: 8 MMOL/L (ref 2–12)
AST SERPL-CCNC: 17 U/L (ref 15–37)
BASOPHILS # BLD: 0.04 K/UL (ref 0–0.1)
BASOPHILS NFR BLD: 0.4 % (ref 0–1)
BILIRUB SERPL-MCNC: 0.4 MG/DL (ref 0.2–1)
BUN SERPL-MCNC: 6 MG/DL (ref 6–20)
BUN/CREAT SERPL: 6 (ref 12–20)
CALCIUM SERPL-MCNC: 9.4 MG/DL (ref 8.5–10.1)
CHLORIDE SERPL-SCNC: 106 MMOL/L (ref 97–108)
CO2 SERPL-SCNC: 24 MMOL/L (ref 21–32)
COMMENT:: NORMAL
CREAT SERPL-MCNC: 1.04 MG/DL (ref 0.7–1.3)
DIFFERENTIAL METHOD BLD: ABNORMAL
EOSINOPHIL # BLD: 0.01 K/UL (ref 0–0.4)
EOSINOPHIL NFR BLD: 0.1 % (ref 0–7)
ERYTHROCYTE [DISTWIDTH] IN BLOOD BY AUTOMATED COUNT: 12.8 % (ref 11.5–14.5)
GLOBULIN SER CALC-MCNC: 4.2 G/DL (ref 2–4)
GLUCOSE SERPL-MCNC: 119 MG/DL (ref 65–100)
HCT VFR BLD AUTO: 42.6 % (ref 36.6–50.3)
HGB BLD-MCNC: 14.4 G/DL (ref 12.1–17)
IMM GRANULOCYTES # BLD AUTO: 0.03 K/UL (ref 0–0.04)
IMM GRANULOCYTES NFR BLD AUTO: 0.3 % (ref 0–0.5)
LIPASE SERPL-CCNC: 23 U/L (ref 13–75)
LYMPHOCYTES # BLD: 1.57 K/UL (ref 0.8–3.5)
LYMPHOCYTES NFR BLD: 14.6 % (ref 12–49)
MCH RBC QN AUTO: 28.3 PG (ref 26–34)
MCHC RBC AUTO-ENTMCNC: 33.8 G/DL (ref 30–36.5)
MCV RBC AUTO: 83.9 FL (ref 80–99)
MONOCYTES # BLD: 0.37 K/UL (ref 0–1)
MONOCYTES NFR BLD: 3.4 % (ref 5–13)
NEUTS SEG # BLD: 8.77 K/UL (ref 1.8–8)
NEUTS SEG NFR BLD: 81.2 % (ref 32–75)
NRBC # BLD: 0 K/UL (ref 0–0.01)
NRBC BLD-RTO: 0 PER 100 WBC
PLATELET # BLD AUTO: 290 K/UL (ref 150–400)
PMV BLD AUTO: 9.5 FL (ref 8.9–12.9)
POTASSIUM SERPL-SCNC: 3.6 MMOL/L (ref 3.5–5.1)
PROT SERPL-MCNC: 7.7 G/DL (ref 6.4–8.2)
RBC # BLD AUTO: 5.08 M/UL (ref 4.1–5.7)
SODIUM SERPL-SCNC: 138 MMOL/L (ref 136–145)
SPECIMEN HOLD: NORMAL
TROPONIN I SERPL HS-MCNC: 6 NG/L (ref 0–76)
WBC # BLD AUTO: 10.8 K/UL (ref 4.1–11.1)

## 2025-03-22 PROCEDURE — 6370000000 HC RX 637 (ALT 250 FOR IP)

## 2025-03-22 PROCEDURE — 83690 ASSAY OF LIPASE: CPT

## 2025-03-22 PROCEDURE — 80053 COMPREHEN METABOLIC PANEL: CPT

## 2025-03-22 PROCEDURE — 76705 ECHO EXAM OF ABDOMEN: CPT

## 2025-03-22 PROCEDURE — 85025 COMPLETE CBC W/AUTO DIFF WBC: CPT

## 2025-03-22 PROCEDURE — 96374 THER/PROPH/DIAG INJ IV PUSH: CPT

## 2025-03-22 PROCEDURE — 99284 EMERGENCY DEPT VISIT MOD MDM: CPT

## 2025-03-22 PROCEDURE — 84484 ASSAY OF TROPONIN QUANT: CPT

## 2025-03-22 PROCEDURE — 36415 COLL VENOUS BLD VENIPUNCTURE: CPT

## 2025-03-22 PROCEDURE — 6360000002 HC RX W HCPCS

## 2025-03-22 RX ORDER — KETOROLAC TROMETHAMINE 30 MG/ML
30 INJECTION, SOLUTION INTRAMUSCULAR; INTRAVENOUS ONCE
Status: COMPLETED | OUTPATIENT
Start: 2025-03-22 | End: 2025-03-22

## 2025-03-22 RX ORDER — ACETAMINOPHEN 500 MG
1000 TABLET ORAL
Status: COMPLETED | OUTPATIENT
Start: 2025-03-22 | End: 2025-03-22

## 2025-03-22 RX ADMIN — KETOROLAC TROMETHAMINE 30 MG: 30 INJECTION, SOLUTION INTRAMUSCULAR at 18:36

## 2025-03-22 RX ADMIN — ACETAMINOPHEN 1000 MG: 500 TABLET ORAL at 18:37

## 2025-03-22 ASSESSMENT — PAIN DESCRIPTION - LOCATION
LOCATION: ABDOMEN
LOCATION: ABDOMEN

## 2025-03-22 ASSESSMENT — PAIN SCALES - GENERAL
PAINLEVEL_OUTOF10: 10
PAINLEVEL_OUTOF10: 8

## 2025-03-22 ASSESSMENT — PAIN DESCRIPTION - DESCRIPTORS: DESCRIPTORS: BURNING

## 2025-03-22 ASSESSMENT — PAIN - FUNCTIONAL ASSESSMENT: PAIN_FUNCTIONAL_ASSESSMENT: 0-10

## 2025-03-22 ASSESSMENT — PAIN DESCRIPTION - ORIENTATION: ORIENTATION: MID

## 2025-03-22 NOTE — ED PROVIDER NOTES
[facility] EMERGENCY DEPARTMENT  EMERGENCY DEPARTMENT ENCOUNTER      Pt Name: Guzman Abdullahi  MRN: [id number]  Birthdate 1975  Date of evaluation: 3/22/2025  Provider: Meryl Begum PA-C    CHIEF COMPLAINT       Chief Complaint   Patient presents with    Abdominal Pain         HISTORY OF PRESENT ILLNESS   (Location/Symptom, Timing/Onset, Context/Setting, Quality, Duration, Modifying Factors, Severity)  Note limiting factors.   Patient is a 49-year-old male with complaints of right upper quadrant pain that he states radiates to his right side of his back that started around 2 AM today.  Patient states that he does have a history of diverticulitis but has not had a flareup in years.  He states that he has had this pain similar to this in the past and that it comes and goes.  He denies any abdominal surgeries.  He denies any other pain in any other quadrant.  He denies nausea vomiting diarrhea, constipation, fevers.            Review of External Medical Records:     Nursing Notes were reviewed.    REVIEW OF SYSTEMS    (2-9 systems for level 4, 10 or more for level 5)     Review of Systems    Except as noted above the remainder of the review of systems was reviewed and negative.       PAST MEDICAL HISTORY   No past medical history on file.      SURGICAL HISTORY     No past surgical history on file.      CURRENT MEDICATIONS       Previous Medications    No medications on file       ALLERGIES     Patient has no known allergies.    FAMILY HISTORY     No family history on file.       SOCIAL HISTORY       Social History     Socioeconomic History    Marital status: Single           PHYSICAL EXAM    (up to 7 for level 4, 8 or more for level 5)     ED Triage Vitals [03/22/25 1619]   BP Systolic BP Percentile Diastolic BP Percentile Temp Temp Source Pulse Respirations SpO2   (!) 137/91 -- -- 98.2 °F (36.8 °C) Oral 84 16 100 %      Height Weight         -- --             There is no height or weight

## 2025-03-22 NOTE — ED TRIAGE NOTES
Pt arrives to ED via POV ambulatory c/o mid abd pain that radiates to back starting at 0200. Hx diverticulitis. Pt denies n/v, diarrhea, constipation, fever. Provider in triage.

## 2025-03-22 NOTE — DISCHARGE INSTRUCTIONS
Your workup today was reassuring in many ways.  Your blood work does not reveal an elevated white count and your liver enzymes and CMP are within normal limits.  Your lipase was also within normal limits as well as your troponin which is the level of the cardiac enzyme of your heart.  From the ultrasound that we ordered in the right upper quadrant where you are having your pain there was evidence of cholelithiasis which is gallstones.  But there was no evidence of acute cholecystitis which is the inflammation or a blockage.  Based on these results and you being overall well-appearing I recommend following up with general surgery as an outpatient for further investigation and management of this.  In the meantime manage her pain with ibuprofen and Tylenol.  If your symptoms persist, worsen, or change then please come back to the emergency department.    Thank you for allowing us to provide you with medical care today.  We realize that you have many choices for your emergency care needs.  We thank you for choosing [facility].  Please choose us in the future for any continued health care needs.     The exam and treatment you received in the Emergency Department were for an emergent problem and are not intended as complete care. It is important that you follow up with a doctor, nurse practitioner, or physician assistant for ongoing care. If your symptoms worsen or you do not improve as expected and you are unable to reach your usual health care provider, you should return to the Emergency Department. We are available 24 hours a day.     Please make an appointment with your healthcare provider(s) for follow up of your Emergency Department visit.  Take this sheet with you when you go to your follow-up visit.

## 2025-03-24 ENCOUNTER — TELEPHONE (OUTPATIENT)
Age: 50
End: 2025-03-24

## 2025-03-24 NOTE — TELEPHONE ENCOUNTER
Received VM from patient's sister Glenda Abdullahi who requested to schedule ED follow up visit with Dr. Min. Placed outbound call to phone number [phone]. No answer, LVM requesting call back if they are still interested in scheduling with a general surgeon.

## 2025-03-31 ENCOUNTER — TELEPHONE (OUTPATIENT)
Age: 50
End: 2025-03-31

## 2025-04-02 ENCOUNTER — OFFICE VISIT (OUTPATIENT)
Age: 50
End: 2025-04-02
Payer: COMMERCIAL

## 2025-04-02 VITALS
RESPIRATION RATE: 18 BRPM | OXYGEN SATURATION: 98 % | TEMPERATURE: 97.7 F | DIASTOLIC BLOOD PRESSURE: 81 MMHG | BODY MASS INDEX: 31.98 KG/M2 | SYSTOLIC BLOOD PRESSURE: 119 MMHG | HEIGHT: 70 IN | WEIGHT: 223.4 LBS | HEART RATE: 101 BPM

## 2025-04-02 DIAGNOSIS — K80.50 BILIARY COLIC: ICD-10-CM

## 2025-04-02 DIAGNOSIS — K57.92 DIVERTICULITIS: Primary | ICD-10-CM

## 2025-04-02 DIAGNOSIS — K80.20 GALL STONES: ICD-10-CM

## 2025-04-02 PROCEDURE — 99204 OFFICE O/P NEW MOD 45 MIN: CPT | Performed by: SURGERY

## 2025-04-02 ASSESSMENT — PATIENT HEALTH QUESTIONNAIRE - PHQ9
2. FEELING DOWN, DEPRESSED OR HOPELESS: NOT AT ALL
SUM OF ALL RESPONSES TO PHQ QUESTIONS 1-9: 0
1. LITTLE INTEREST OR PLEASURE IN DOING THINGS: NOT AT ALL

## 2025-04-02 NOTE — PROGRESS NOTES
Identified pt with two pt identifiers (name and ). Reviewed chart in preparation for visit and have obtained necessary documentation.    Guzman Abdullahi is a 49 y.o. male  Chief Complaint   Patient presents with    New Patient    Cholelithiasis    ED Follow-up     /81 (BP Site: Left Upper Arm, Patient Position: Sitting, BP Cuff Size: Large Adult)   Pulse (!) 101   Temp 97.7 °F (36.5 °C) (Oral)   Resp 18   Ht 1.778 m (5' 10\")   Wt 101.3 kg (223 lb 6.4 oz)   SpO2 98%   BMI 32.05 kg/m²     1. Have you been to the ER, urgent care clinic since your last visit?  Hospitalized since your last visit?no    2. Have you seen or consulted any other health care providers outside of the [facility] since your last visit?  Include any pap smears or colon screening. no  
even though he can try low-fat diet then recommend robotic cholecystectomy with indocyanine green.  Explained to patient gallbladder anatomy.  Recommend robotic assisted laparoscopic cholecystectomy with indocyanine green.  Explained the procedure along with risks and benefits.  Risks explained are, but not limited to, infection, bleeding, injury to nearby structures (ducts, intestine, etc.), bile leaks, hernias.  Explained that about 10% of patients develop self limiting diarrhea.  Explained post-operative care to be no heavy lifting (>10-15lbs) for 4 weeks from the date of surgery.  Low fat diet for 2 weeks then, no dietary restrictions.  May shower 24 hours after surgery. No baths or pools for 2 weeks    For his diverticulitis at this time I recommend he continue the clear liquid diet for the next 2 days or so.  I stressed the importance that he needs to have a colonoscopy.  If he continues to have air through the urine and stool through the urine then he needs to definitely follow back up.  I again expressed that it would be very beneficial for him to get a colonoscopy before we needed to proceed with any surgery.  I explained that the things that have been shown to prevent episodes of diverticulitis are avoiding red meats, avoiding smoking, avoiding constipation with hydration, and taking an fiber.  Patient will call back if he is ready to schedule surgery for gallbladder  I explained that I would put in a referral for gastroenterology for a colonoscopy    Juana Min MD   [facility]

## 2025-05-21 ENCOUNTER — APPOINTMENT (OUTPATIENT)
Facility: HOSPITAL | Age: 50
End: 2025-05-21
Payer: COMMERCIAL

## 2025-05-21 ENCOUNTER — HOSPITAL ENCOUNTER (EMERGENCY)
Facility: HOSPITAL | Age: 50
Discharge: HOME | End: 2025-05-21
Attending: EMERGENCY MEDICINE
Payer: COMMERCIAL

## 2025-05-21 VITALS
BODY MASS INDEX: 32.35 KG/M2 | HEART RATE: 68 BPM | HEIGHT: 70 IN | OXYGEN SATURATION: 99 % | WEIGHT: 226 LBS | SYSTOLIC BLOOD PRESSURE: 135 MMHG | TEMPERATURE: 98.1 F | RESPIRATION RATE: 18 BRPM | DIASTOLIC BLOOD PRESSURE: 75 MMHG

## 2025-05-21 DIAGNOSIS — N45.1 LEFT EPIDIDYMITIS: Primary | ICD-10-CM

## 2025-05-21 LAB
ALBUMIN SERPL-MCNC: 3.2 G/DL (ref 3.5–5)
ALBUMIN/GLOB SERPL: 0.8 (ref 1.1–2.2)
ALP SERPL-CCNC: 84 U/L (ref 45–117)
ALT SERPL-CCNC: 27 U/L (ref 12–78)
ANION GAP SERPL CALC-SCNC: 9 MMOL/L (ref 2–12)
AST SERPL-CCNC: 33 U/L (ref 15–37)
BASOPHILS # BLD: 0.04 K/UL (ref 0–0.1)
BASOPHILS NFR BLD: 0.3 % (ref 0–1)
BILIRUB SERPL-MCNC: 0.9 MG/DL (ref 0.2–1)
BUN SERPL-MCNC: 6 MG/DL (ref 6–20)
BUN/CREAT SERPL: 6 (ref 12–20)
CALCIUM SERPL-MCNC: 8.8 MG/DL (ref 8.5–10.1)
CHLORIDE SERPL-SCNC: 110 MMOL/L (ref 97–108)
CO2 SERPL-SCNC: 20 MMOL/L (ref 21–32)
CREAT SERPL-MCNC: 1.07 MG/DL (ref 0.7–1.3)
CRP SERPL-MCNC: 3.82 MG/DL (ref 0–0.3)
DIFFERENTIAL METHOD BLD: ABNORMAL
EOSINOPHIL # BLD: 0.02 K/UL (ref 0–0.4)
EOSINOPHIL NFR BLD: 0.1 % (ref 0–7)
ERYTHROCYTE [DISTWIDTH] IN BLOOD BY AUTOMATED COUNT: 13.3 % (ref 11.5–14.5)
GLOBULIN SER CALC-MCNC: 4 G/DL (ref 2–4)
GLUCOSE SERPL-MCNC: 117 MG/DL (ref 65–100)
HCT VFR BLD AUTO: 42.5 % (ref 36.6–50.3)
HGB BLD-MCNC: 14.1 G/DL (ref 12.1–17)
IMM GRANULOCYTES # BLD AUTO: 0.06 K/UL (ref 0–0.04)
IMM GRANULOCYTES NFR BLD AUTO: 0.4 % (ref 0–0.5)
LYMPHOCYTES # BLD: 1 K/UL (ref 0.8–3.5)
LYMPHOCYTES NFR BLD: 6.6 % (ref 12–49)
MCH RBC QN AUTO: 27.8 PG (ref 26–34)
MCHC RBC AUTO-ENTMCNC: 33.2 G/DL (ref 30–36.5)
MCV RBC AUTO: 83.8 FL (ref 80–99)
MONOCYTES # BLD: 0.99 K/UL (ref 0–1)
MONOCYTES NFR BLD: 6.5 % (ref 5–13)
NEUTS SEG # BLD: 13.03 K/UL (ref 1.8–8)
NEUTS SEG NFR BLD: 86.1 % (ref 32–75)
NRBC # BLD: 0 K/UL (ref 0–0.01)
NRBC BLD-RTO: 0 PER 100 WBC
PLATELET # BLD AUTO: 282 K/UL (ref 150–400)
PMV BLD AUTO: 9.7 FL (ref 8.9–12.9)
POTASSIUM SERPL-SCNC: 3.7 MMOL/L (ref 3.5–5.1)
PROT SERPL-MCNC: 7.2 G/DL (ref 6.4–8.2)
RBC # BLD AUTO: 5.07 M/UL (ref 4.1–5.7)
SODIUM SERPL-SCNC: 139 MMOL/L (ref 136–145)
WBC # BLD AUTO: 15.1 K/UL (ref 4.1–11.1)

## 2025-05-21 PROCEDURE — 6370000000 HC RX 637 (ALT 250 FOR IP): Performed by: EMERGENCY MEDICINE

## 2025-05-21 PROCEDURE — 86140 C-REACTIVE PROTEIN: CPT

## 2025-05-21 PROCEDURE — 36415 COLL VENOUS BLD VENIPUNCTURE: CPT

## 2025-05-21 PROCEDURE — 96374 THER/PROPH/DIAG INJ IV PUSH: CPT

## 2025-05-21 PROCEDURE — 80053 COMPREHEN METABOLIC PANEL: CPT

## 2025-05-21 PROCEDURE — 76870 US EXAM SCROTUM: CPT

## 2025-05-21 PROCEDURE — 96375 TX/PRO/DX INJ NEW DRUG ADDON: CPT

## 2025-05-21 PROCEDURE — 99284 EMERGENCY DEPT VISIT MOD MDM: CPT

## 2025-05-21 PROCEDURE — 85025 COMPLETE CBC W/AUTO DIFF WBC: CPT

## 2025-05-21 PROCEDURE — 6360000002 HC RX W HCPCS: Performed by: EMERGENCY MEDICINE

## 2025-05-21 RX ORDER — ONDANSETRON 2 MG/ML
4 INJECTION INTRAMUSCULAR; INTRAVENOUS ONCE
Status: COMPLETED | OUTPATIENT
Start: 2025-05-21 | End: 2025-05-21

## 2025-05-21 RX ORDER — HYDROCODONE BITARTRATE AND ACETAMINOPHEN 5; 325 MG/1; MG/1
1 TABLET ORAL
Refills: 0 | Status: COMPLETED | OUTPATIENT
Start: 2025-05-21 | End: 2025-05-21

## 2025-05-21 RX ORDER — KETOROLAC TROMETHAMINE 30 MG/ML
30 INJECTION, SOLUTION INTRAMUSCULAR; INTRAVENOUS
Status: COMPLETED | OUTPATIENT
Start: 2025-05-21 | End: 2025-05-21

## 2025-05-21 RX ORDER — HYDROCODONE BITARTRATE AND ACETAMINOPHEN 5; 325 MG/1; MG/1
1 TABLET ORAL EVERY 8 HOURS PRN
Qty: 10 TABLET | Refills: 0 | Status: SHIPPED | OUTPATIENT
Start: 2025-05-21 | End: 2025-05-24

## 2025-05-21 RX ORDER — NAPROXEN 500 MG/1
500 TABLET ORAL 2 TIMES DAILY WITH MEALS
Qty: 60 TABLET | Refills: 0 | Status: SHIPPED | OUTPATIENT
Start: 2025-05-21

## 2025-05-21 RX ORDER — CIPROFLOXACIN 500 MG/1
500 TABLET, FILM COATED ORAL 2 TIMES DAILY
Qty: 20 TABLET | Refills: 0 | Status: SHIPPED | OUTPATIENT
Start: 2025-05-21 | End: 2025-05-31

## 2025-05-21 RX ORDER — DOXYCYCLINE HYCLATE 100 MG
100 TABLET ORAL 2 TIMES DAILY
Qty: 20 TABLET | Refills: 0 | Status: SHIPPED | OUTPATIENT
Start: 2025-05-21 | End: 2025-05-31

## 2025-05-21 RX ADMIN — HYDROMORPHONE HYDROCHLORIDE 0.5 MG: 1 INJECTION, SOLUTION INTRAMUSCULAR; INTRAVENOUS; SUBCUTANEOUS at 03:13

## 2025-05-21 RX ADMIN — HYDROCODONE BITARTRATE AND ACETAMINOPHEN 1 TABLET: 5; 325 TABLET ORAL at 04:59

## 2025-05-21 RX ADMIN — ONDANSETRON 4 MG: 2 INJECTION, SOLUTION INTRAMUSCULAR; INTRAVENOUS at 03:13

## 2025-05-21 RX ADMIN — KETOROLAC TROMETHAMINE 30 MG: 30 INJECTION, SOLUTION INTRAMUSCULAR at 03:16

## 2025-05-21 ASSESSMENT — PAIN DESCRIPTION - LOCATION
LOCATION: SCROTUM
LOCATION: SCROTUM

## 2025-05-21 ASSESSMENT — PAIN DESCRIPTION - DESCRIPTORS: DESCRIPTORS: SHARP;SHOOTING;ACHING

## 2025-05-21 ASSESSMENT — PAIN - FUNCTIONAL ASSESSMENT
PAIN_FUNCTIONAL_ASSESSMENT: 0-10
PAIN_FUNCTIONAL_ASSESSMENT: 0-10

## 2025-05-21 ASSESSMENT — PAIN SCALES - GENERAL
PAINLEVEL_OUTOF10: 10
PAINLEVEL_OUTOF10: 7
PAINLEVEL_OUTOF10: 10

## 2025-05-21 ASSESSMENT — PAIN DESCRIPTION - ORIENTATION: ORIENTATION: LEFT

## 2025-05-21 NOTE — ED PROVIDER NOTES
[facility] EMERGENCY DEPARTMENT  EMERGENCY DEPARTMENT ENCOUNTER      Pt Name: Guzman Abdullahi  MRN: [id number]  Birthdate 1975  Date of evaluation: 5/21/2025  Provider: Collin Doyle MD    CHIEF COMPLAINT       Chief Complaint   Patient presents with    Testicle Pain         HISTORY OF PRESENT ILLNESS   (Location/Symptom, Timing/Onset, Context/Setting, Quality, Duration, Modifying Factors, Severity)  Note limiting factors.   49-year-old male presented to ER for evaluation for left scrotal pain and swelling that began 2 days ago and has gotten progressively worse.  He described a dull throbbing discomfort, severity 10 out of 10, constant, worse with movement, no relieving factors, accompanied by nausea, vomiting and diaphoresis.  The patient denies any fever, headache, chest pain shortness of breath, abdominal pain, recent history of trauma or fall, prior history of the same.            Review of External Medical Records:     Nursing Notes were reviewed.    REVIEW OF SYSTEMS    (2-9 systems for level 4, 10 or more for level 5)     Review of Systems   All other systems reviewed and are negative.      Except as noted above the remainder of the review of systems was reviewed and negative.       PAST MEDICAL HISTORY     Past Medical History:   Diagnosis Date    Diverticulitis          SURGICAL HISTORY     No past surgical history on file.      CURRENT MEDICATIONS       Discharge Medication List as of 5/21/2025  4:02 AM          ALLERGIES     Patient has no known allergies.    FAMILY HISTORY     No family history on file.       SOCIAL HISTORY       Social History     Socioeconomic History    Marital status: Single   Tobacco Use    Smoking status: Every Day     Current packs/day: 1.00     Average packs/day: 1 pack/day for 20.4 years (20.4 ttl pk-yrs)     Types: Cigarettes     Start date: 1/1/2005    Smokeless tobacco: Never   Substance and Sexual Activity    Alcohol use: Not Currently    Drug

## 2025-05-21 NOTE — ED TRIAGE NOTES
Patient arrives to Triage via wheelchair with c/o left testicle pain and swelling that started yesterday    Denies any burning with urination

## 2025-07-08 ENCOUNTER — TELEPHONE (OUTPATIENT)
Age: 50
End: 2025-07-08

## 2025-07-08 NOTE — TELEPHONE ENCOUNTER
Incoming call from patient's sister wanting to schedule surgery for patient. Name and  verified for patient and sister's name.     Glenda stated she wanted to schedule surgery for her brother, but reported he has not set up an appointment or had the colonoscopy done yet that Dr. Min recommended. She was provided the referred doctor's name and number for gastro. She stated she will set that appt up for him to have the colonoscopy done and they will call back. Call was ended.